# Patient Record
Sex: FEMALE | Race: WHITE | NOT HISPANIC OR LATINO | URBAN - METROPOLITAN AREA
[De-identification: names, ages, dates, MRNs, and addresses within clinical notes are randomized per-mention and may not be internally consistent; named-entity substitution may affect disease eponyms.]

---

## 2023-09-13 PROBLEM — J34.2 DEVIATED NASAL SEPTUM: Status: ACTIVE | Noted: 2021-04-27

## 2023-09-13 PROBLEM — J30.2 SEASONAL ALLERGIC RHINITIS: Status: ACTIVE | Noted: 2020-11-12

## 2023-09-13 PROBLEM — E66.9 OBESITY: Status: ACTIVE | Noted: 2023-09-13

## 2023-09-13 PROBLEM — B00.1 RECURRENT COLD SORES: Status: ACTIVE | Noted: 2023-09-13

## 2023-09-13 PROBLEM — J34.3 HYPERTROPHY OF NASAL TURBINATES: Status: ACTIVE | Noted: 2021-04-27

## 2023-09-13 PROBLEM — D64.9 ANEMIA: Status: ACTIVE | Noted: 2023-09-13

## 2023-09-13 PROBLEM — R09.81 NASAL CONGESTION: Status: ACTIVE | Noted: 2021-04-27

## 2023-09-13 PROBLEM — E55.9 VITAMIN D DEFICIENCY: Status: ACTIVE | Noted: 2023-09-13

## 2023-09-13 RX ORDER — FLUTICASONE PROPIONATE 50 MCG
2 SPRAY, SUSPENSION (ML) NASAL DAILY
COMMUNITY
Start: 2015-06-16

## 2023-09-13 RX ORDER — AZELASTINE 1 MG/ML
2 SPRAY, METERED NASAL 2 TIMES DAILY
COMMUNITY
Start: 2022-10-25

## 2023-09-13 RX ORDER — LEVONORGESTREL 52 MG/1
INTRAUTERINE DEVICE INTRAUTERINE
COMMUNITY

## 2023-09-13 RX ORDER — ASPIRIN 325 MG
1 TABLET, DELAYED RELEASE (ENTERIC COATED) ORAL
COMMUNITY

## 2023-09-13 RX ORDER — VALACYCLOVIR HYDROCHLORIDE 1 G/1
1 TABLET, FILM COATED ORAL 3 TIMES DAILY
COMMUNITY

## 2023-09-13 RX ORDER — VALACYCLOVIR HYDROCHLORIDE 500 MG/1
2000 TABLET, FILM COATED ORAL 2 TIMES DAILY PRN
COMMUNITY
Start: 2018-07-11

## 2023-09-13 RX ORDER — BETAMETHASONE DIPROPIONATE 0.5 MG/G
CREAM TOPICAL
COMMUNITY

## 2023-09-13 RX ORDER — GLUC/MSM/COLGN2/HYAL/ANTIARTH3 375-375-20
TABLET ORAL
COMMUNITY

## 2023-09-13 RX ORDER — ERGOCALCIFEROL 1.25 MG/1
1 CAPSULE ORAL
COMMUNITY

## 2023-09-13 RX ORDER — CETIRIZINE HYDROCHLORIDE 10 MG/1
1 TABLET ORAL DAILY
COMMUNITY
Start: 2013-09-04 | End: 2024-01-22

## 2023-09-13 RX ORDER — ACETAMINOPHEN 500 MG
TABLET ORAL
COMMUNITY
Start: 2022-10-19

## 2023-10-24 ENCOUNTER — OFFICE VISIT (OUTPATIENT)
Dept: OTOLARYNGOLOGY | Facility: CLINIC | Age: 38
End: 2023-10-24
Payer: COMMERCIAL

## 2023-10-24 VITALS — WEIGHT: 212 LBS | HEIGHT: 63 IN | BODY MASS INDEX: 37.56 KG/M2

## 2023-10-24 DIAGNOSIS — J30.1 NON-SEASONAL ALLERGIC RHINITIS DUE TO POLLEN: Primary | ICD-10-CM

## 2023-10-24 DIAGNOSIS — J34.2 DEVIATED NASAL SEPTUM: ICD-10-CM

## 2023-10-24 DIAGNOSIS — J34.3 HYPERTROPHY OF NASAL TURBINATES: ICD-10-CM

## 2023-10-24 PROCEDURE — 1036F TOBACCO NON-USER: CPT | Performed by: OTOLARYNGOLOGY

## 2023-10-24 PROCEDURE — 99213 OFFICE O/P EST LOW 20 MIN: CPT | Performed by: OTOLARYNGOLOGY

## 2023-10-24 RX ORDER — AZELASTINE 1 MG/ML
2 SPRAY, METERED NASAL 2 TIMES DAILY
Qty: 90 ML | Refills: 3 | Status: SHIPPED | OUTPATIENT
Start: 2023-10-24

## 2023-10-24 RX ORDER — FLUTICASONE PROPIONATE 50 MCG
2 SPRAY, SUSPENSION (ML) NASAL DAILY
Qty: 48 G | Refills: 3 | Status: SHIPPED | OUTPATIENT
Start: 2023-10-24

## 2023-10-24 NOTE — LETTER
October 24, 2023     Linn Chen, APRN-CNP  [address] [place] [facility], [address]  [place] [address]    Patient: Ludivina Gaytan   YOB: 1985   Date of Visit: 10/24/2023       Dear Dr. Linn Chen, APRN-CNP:    Thank you for referring Ludivina Gaytan to me for evaluation. Below are my notes for this consultation.  If you have questions, please do not hesitate to call me. I look forward to following your patient along with you.       Sincerely,     Benton West MD      CC: No Recipients  ______________________________________________________________________________________    Subjective  Patient ID: Ludivina Gaytan is a 38 y.o. female who presents for No chief complaint on file..  HPI  Patient presents for follow-up for chronic rhinitis with deviation of the septum and hypertrophy of the turbinates.  She continues to use the Flonase and Astelin nasal sprays with good control of her allergy symptoms.  Review of Systems    Objective  Physical Exam  The following elements of a brief ear nose and throat exam were performed: External ear canals and tympanic membranes, external nose and nasal passages, oral cavity, palpation of the neck, percussion of the face, palpation of the thyroid.    There is nasal edema and the turbinates are pale.  There is no purulence or facial tenderness noted.  Assessment/Plan  Diagnoses and all orders for this visit:  Non-seasonal allergic rhinitis due to pollen  -     fluticasone (Flonase) 50 mcg/actuation nasal spray; Administer 2 sprays into each nostril once daily. Shake gently. Before first use, prime pump. After use, clean tip and replace cap.  -     azelastine (Astelin) 137 mcg (0.1 %) nasal spray; Administer 2 sprays into each nostril 2 times a day.  Deviated nasal septum  Hypertrophy of nasal turbinates    Allergic rhinitis with deviation of the septum and hypertrophy of the turbinates controlled with Flonase and Astelin nasal  sprays on a long-term basis. The patient does not want surgery. Her prescriptions were renewed today and reconciled her current medication list and she will follow-up in 1 year.

## 2023-10-24 NOTE — PROGRESS NOTES
Subjective   Patient ID: Ludivina Gaytan is a 38 y.o. female who presents for No chief complaint on file..  HPI  Patient presents for follow-up for chronic rhinitis with deviation of the septum and hypertrophy of the turbinates.  She continues to use the Flonase and Astelin nasal sprays with good control of her allergy symptoms.  Review of Systems    Objective   Physical Exam  The following elements of a brief ear nose and throat exam were performed: External ear canals and tympanic membranes, external nose and nasal passages, oral cavity, palpation of the neck, percussion of the face, palpation of the thyroid.    There is nasal edema and the turbinates are pale.  There is no purulence or facial tenderness noted.  Assessment/Plan   Diagnoses and all orders for this visit:  Non-seasonal allergic rhinitis due to pollen  -     fluticasone (Flonase) 50 mcg/actuation nasal spray; Administer 2 sprays into each nostril once daily. Shake gently. Before first use, prime pump. After use, clean tip and replace cap.  -     azelastine (Astelin) 137 mcg (0.1 %) nasal spray; Administer 2 sprays into each nostril 2 times a day.  Deviated nasal septum  Hypertrophy of nasal turbinates    Allergic rhinitis with deviation of the septum and hypertrophy of the turbinates controlled with Flonase and Astelin nasal sprays on a long-term basis. The patient does not want surgery. Her prescriptions were renewed today and reconciled her current medication list and she will follow-up in 1 year.

## 2024-01-20 DIAGNOSIS — J30.2 SEASONAL ALLERGIC RHINITIS, UNSPECIFIED TRIGGER: ICD-10-CM

## 2024-01-22 RX ORDER — CETIRIZINE HYDROCHLORIDE 10 MG/1
10 TABLET ORAL DAILY
Qty: 90 TABLET | Refills: 3 | Status: SHIPPED | OUTPATIENT
Start: 2024-01-22

## 2024-06-24 ENCOUNTER — APPOINTMENT (OUTPATIENT)
Dept: OBSTETRICS AND GYNECOLOGY | Facility: CLINIC | Age: 39
End: 2024-06-24
Payer: COMMERCIAL

## 2024-06-24 VITALS
SYSTOLIC BLOOD PRESSURE: 126 MMHG | WEIGHT: 212 LBS | DIASTOLIC BLOOD PRESSURE: 80 MMHG | HEIGHT: 63 IN | BODY MASS INDEX: 37.56 KG/M2

## 2024-06-24 DIAGNOSIS — Z30.431 IUD CHECK UP: Primary | ICD-10-CM

## 2024-06-24 PROCEDURE — 1036F TOBACCO NON-USER: CPT | Performed by: NURSE PRACTITIONER

## 2024-06-24 PROCEDURE — 99213 OFFICE O/P EST LOW 20 MIN: CPT | Performed by: NURSE PRACTITIONER

## 2024-06-24 ASSESSMENT — ENCOUNTER SYMPTOMS
RESPIRATORY NEGATIVE: 0
MUSCULOSKELETAL NEGATIVE: 0
NEUROLOGICAL NEGATIVE: 0
CARDIOVASCULAR NEGATIVE: 0
ALLERGIC/IMMUNOLOGIC NEGATIVE: 0
ENDOCRINE NEGATIVE: 0
GASTROINTESTINAL NEGATIVE: 0
HEMATOLOGIC/LYMPHATIC NEGATIVE: 0
EYES NEGATIVE: 0
CONSTITUTIONAL NEGATIVE: 0
PSYCHIATRIC NEGATIVE: 0

## 2024-06-24 ASSESSMENT — PAIN SCALES - GENERAL: PAINLEVEL: 0-NO PAIN

## 2024-06-24 NOTE — PROGRESS NOTES
Subjective   Patient ID: Ludivina Gaytan is a 38 y.o. female who presents for IUD check.  HPI  Mirena IUD inserted 2/2022  Today she states:  Amenorrhea, no dyspareunia, or dysmenorrhea  Review of Systems    Objective   Physical Exam  Genitourinary:     General: Normal vulva.      Vagina: Normal.      Cervix: Normal.      Comments: IUD strings noted, IUD appears to have correct placement        Assessment/Plan   Diagnoses and all orders for this visit:  IUD check up    S/s expulsion reviewed  Follow up for annual exam/PRN       BALBINA Dawn-CNP 06/24/24 11:38 AM

## 2024-07-01 ENCOUNTER — TELEPHONE (OUTPATIENT)
Dept: PRIMARY CARE | Facility: CLINIC | Age: 39
End: 2024-07-01
Payer: COMMERCIAL

## 2024-07-03 ENCOUNTER — OFFICE VISIT (OUTPATIENT)
Dept: PRIMARY CARE | Facility: CLINIC | Age: 39
End: 2024-07-03
Payer: COMMERCIAL

## 2024-07-03 VITALS
BODY MASS INDEX: 37.55 KG/M2 | WEIGHT: 212 LBS | SYSTOLIC BLOOD PRESSURE: 114 MMHG | DIASTOLIC BLOOD PRESSURE: 74 MMHG | OXYGEN SATURATION: 100 % | HEART RATE: 75 BPM

## 2024-07-03 DIAGNOSIS — Z48.02 VISIT FOR SUTURE REMOVAL: Primary | ICD-10-CM

## 2024-07-03 PROCEDURE — 1036F TOBACCO NON-USER: CPT | Performed by: NURSE PRACTITIONER

## 2024-07-03 PROCEDURE — 99213 OFFICE O/P EST LOW 20 MIN: CPT | Performed by: NURSE PRACTITIONER

## 2024-07-03 ASSESSMENT — PATIENT HEALTH QUESTIONNAIRE - PHQ9
2. FEELING DOWN, DEPRESSED OR HOPELESS: NOT AT ALL
SUM OF ALL RESPONSES TO PHQ9 QUESTIONS 1 AND 2: 0
1. LITTLE INTEREST OR PLEASURE IN DOING THINGS: NOT AT ALL

## 2024-07-03 ASSESSMENT — ENCOUNTER SYMPTOMS
OCCASIONAL FEELINGS OF UNSTEADINESS: 0
DEPRESSION: 0
LOSS OF SENSATION IN FEET: 0

## 2024-07-03 NOTE — PROGRESS NOTES
Subjective   Patient ID: Ludivina Gaytan is a 38 y.o. female who presents for Suture / Staple Removal. Pt fell Saturday 6/29 while wearing heels. Laceration to face. Went to [facility]. 7 sutures placed, 2 on upper lip, 5 below nose. No s/sx infection.     HPI   Hx White coat syndrome: She has not been checking BP at home.  AUSTEN: taking iron supplement 1 pill daily.      GYN: Last pap 2018 - normal, repeat 07/2023. Mirena in place. No pregnancies.     SOCIAL: Living at home with  and 2 gerber retrievers. No longer employed. exercise: walking dogs a 5 days a week at home 30 minutes. Goal for 10k steps/day.      Review of Systems    Objective   /74   Pulse 75   Wt 96.2 kg (212 lb)   LMP  (LMP Unknown) Comment: IUD  SpO2 100%   BMI 37.55 kg/m²     Physical Exam  Skin: laceration to upper lip with #5 sutures in place; no s/sx infection, drainage. Wound is healing well.   Assessment/Plan     Facial wound: sutures removed without complication.   Hyperlipidemia: discussed LS changes. lipid panel ordered   Allergies: continue Flonase and Zyrtec   Cold sore: Valtrex 500 mg 4 tabs BID x1 day PRN      Health maintenance: labs ordered. flu vac today. Pap due 07/2023  FU annually for CPE due 10/18/2023     Patient verbalized understanding and agree to plan of care.  Instructed to call or schedule appointment if any changes or as needed.

## 2024-10-22 ENCOUNTER — APPOINTMENT (OUTPATIENT)
Dept: OTOLARYNGOLOGY | Facility: CLINIC | Age: 39
End: 2024-10-22
Payer: COMMERCIAL

## 2024-10-22 VITALS — WEIGHT: 212.08 LBS | HEIGHT: 63 IN | BODY MASS INDEX: 37.58 KG/M2

## 2024-10-22 DIAGNOSIS — J30.1 NON-SEASONAL ALLERGIC RHINITIS DUE TO POLLEN: ICD-10-CM

## 2024-10-22 PROCEDURE — 99213 OFFICE O/P EST LOW 20 MIN: CPT | Performed by: OTOLARYNGOLOGY

## 2024-10-22 PROCEDURE — 3008F BODY MASS INDEX DOCD: CPT | Performed by: OTOLARYNGOLOGY

## 2024-10-22 PROCEDURE — 1036F TOBACCO NON-USER: CPT | Performed by: OTOLARYNGOLOGY

## 2024-10-22 RX ORDER — AZELASTINE 1 MG/ML
2 SPRAY, METERED NASAL 2 TIMES DAILY
Qty: 90 ML | Refills: 3 | Status: SHIPPED | OUTPATIENT
Start: 2024-10-22

## 2024-10-22 RX ORDER — FLUTICASONE PROPIONATE 50 MCG
2 SPRAY, SUSPENSION (ML) NASAL DAILY
Qty: 48 G | Refills: 3 | Status: SHIPPED | OUTPATIENT
Start: 2024-10-22

## 2024-10-22 NOTE — PROGRESS NOTES
Subjective   Patient ID: Ludivina Gaytan is a 39 y.o. female who presents for No chief complaint on file..  HPI  Patient presents for follow-up for chronic rhinitis with deviation of the septum and hypertrophy of the turbinates.  She continues to use the Flonase and Astelin nasal sprays with good control of her allergy symptoms.  Review of Systems    Objective   Physical Exam  The following elements of a brief ear nose and throat exam were performed: External ear canals and tympanic membranes, external nose and nasal passages, oral cavity, palpation of the neck, percussion of the face, palpation of the thyroid.    There is nasal edema and the turbinates are pale.  There is no purulence or facial tenderness noted.  Assessment/Plan   Diagnoses and all orders for this visit:  Non-seasonal allergic rhinitis due to pollen  -     azelastine (Astelin) 137 mcg (0.1 %) nasal spray; Administer 2 sprays into each nostril 2 times a day.  -     fluticasone (Flonase) 50 mcg/actuation nasal spray; Administer 2 sprays into each nostril once daily. Shake gently. Before first use, prime pump. After use, clean tip and replace cap.    Allergic rhinitis with deviation of the septum and hypertrophy of the turbinates controlled with Flonase and Astelin nasal sprays on a long-term basis. The patient does not want surgery. Her prescriptions were renewed today and reconciled her current medication list and she will follow-up in 1 year.

## 2025-10-21 ENCOUNTER — APPOINTMENT (OUTPATIENT)
Dept: OTOLARYNGOLOGY | Facility: CLINIC | Age: 40
End: 2025-10-21
Payer: COMMERCIAL